# Patient Record
Sex: FEMALE | Race: WHITE | NOT HISPANIC OR LATINO | Employment: UNEMPLOYED | URBAN - METROPOLITAN AREA
[De-identification: names, ages, dates, MRNs, and addresses within clinical notes are randomized per-mention and may not be internally consistent; named-entity substitution may affect disease eponyms.]

---

## 2019-06-12 ENCOUNTER — OFFICE VISIT (OUTPATIENT)
Dept: SPORTS MEDICINE | Facility: CLINIC | Age: 44
End: 2019-06-12

## 2019-06-12 VITALS
HEIGHT: 62 IN | WEIGHT: 112 LBS | SYSTOLIC BLOOD PRESSURE: 114 MMHG | DIASTOLIC BLOOD PRESSURE: 74 MMHG | HEART RATE: 93 BPM | TEMPERATURE: 97.9 F | BODY MASS INDEX: 20.61 KG/M2 | OXYGEN SATURATION: 99 %

## 2019-06-12 DIAGNOSIS — J18.9 WALKING PNEUMONIA: Primary | ICD-10-CM

## 2019-06-12 PROCEDURE — 99214 OFFICE O/P EST MOD 30 MIN: CPT | Performed by: FAMILY MEDICINE

## 2019-06-12 RX ORDER — AZITHROMYCIN 250 MG/1
TABLET, FILM COATED ORAL
Qty: 6 TABLET | Refills: 0 | Status: SHIPPED | OUTPATIENT
Start: 2019-06-12 | End: 2020-01-13

## 2019-06-12 NOTE — PROGRESS NOTES
"Tracey is a 43 y.o. year old female    Chief Complaint   Patient presents with   • Fever     x3 days   • Cough     chest, headache, dizziness, getting worse       History of Present Illness  tmax 101 Thurs thru Sat last wk. Has had cough since, NP. No sinus pressure. Tried Mucinex but didn't help.  dx with [facility] over Memorial Day.    I have reviewed the patient's medical, family, and social history in detail and updated the computerized patient record.    Review of Systems  Constitutional: Per HPI.  HEENT: Per HPI  CV: no palpitations  Resp: Per HPI  Musculoskeletal: Negative for myalgias or arthralgias.  Skin: Negative for rash.   Neurological: Negative for weakness and numbness.   Psychiatric/Behavioral: Negative for sleep disturbance.   All other systems reviewed and are negative.    /74   Pulse 93   Temp 97.9 °F (36.6 °C)   Ht 157.5 cm (62\")   Wt 50.8 kg (112 lb)   SpO2 99%   BMI 20.49 kg/m²      Physical Exam    Vital signs reviewed.   General: No acute distress.  Eyes: conjunctiva clear; pupils equally round and reactive  ENT: external ears and nose atraumatic; oropharynx clear  CV: RRR; no peripheral edema, 2+ distal pulses  Resp: CTA b/l; normal respiratory effort, no use of accessory muscles  Skin: no rashes or wounds; normal turgor  Psych: mood and affect appropriate; recent and remote memory intact  Neuro: sensation to light touch intact    Diagnoses and all orders for this visit:    Walking pneumonia  -     azithromycin (ZITHROMAX) 250 MG tablet; Take 2 tablets the first day, then 1 tablet daily for 4 days.      Cough may persist after treatment.  Afebrile today.  If persist greater than 4 weeks or she has any acute worsening symptoms, I recommend a follow-up.    EMR Dragon/Transcription disclaimer:    Much of this encounter note is an electronic transcription/translation of spoken language to printed text.  The electronic translation of spoken language may permit erroneous, or at " times, nonsensical words or phrases to be inadvertently transcribed.  Although I have reviewed the note for such errors some may still exist.

## 2020-01-13 ENCOUNTER — OFFICE VISIT (OUTPATIENT)
Dept: SPORTS MEDICINE | Facility: CLINIC | Age: 45
End: 2020-01-13

## 2020-01-13 VITALS
DIASTOLIC BLOOD PRESSURE: 80 MMHG | WEIGHT: 120 LBS | HEIGHT: 62 IN | TEMPERATURE: 98.6 F | OXYGEN SATURATION: 100 % | SYSTOLIC BLOOD PRESSURE: 120 MMHG | BODY MASS INDEX: 22.08 KG/M2 | HEART RATE: 85 BPM

## 2020-01-13 DIAGNOSIS — Z23 IMMUNIZATION DUE: ICD-10-CM

## 2020-01-13 DIAGNOSIS — Z00.00 ANNUAL PHYSICAL EXAM: Primary | ICD-10-CM

## 2020-01-13 PROCEDURE — 90471 IMMUNIZATION ADMIN: CPT | Performed by: FAMILY MEDICINE

## 2020-01-13 PROCEDURE — 90715 TDAP VACCINE 7 YRS/> IM: CPT | Performed by: FAMILY MEDICINE

## 2020-01-13 PROCEDURE — 99396 PREV VISIT EST AGE 40-64: CPT | Performed by: FAMILY MEDICINE

## 2020-01-13 NOTE — PROGRESS NOTES
"Tracey Green is here today for an annual physical exam.     Eating a healthy diet. Exercising routinely.     GYN exam 6 wks ago.    I have reviewed the patient's medical, family, and social history in detail and updated the computerized patient record.    Health Maintenance   Topic Date Due   • TDAP/TD VACCINES (1 - Tdap) 11/29/1987   • Annual Gynecologic Pelvic and Breast Exam  01/10/2024 (Originally 1975)   • ANNUAL PHYSICAL  01/14/2021   • PAP SMEAR  11/06/2022   • INFLUENZA VACCINE  Completed       PHQ-2 Depression Screening  Little interest or pleasure in doing things? 0   Feeling down, depressed, or hopeless? 0   PHQ-2 Total Score 0         Review of Systems  Constitutional: Negative for chills, fatigue and fever.   HENT: Negative for postnasal drip and sore throat.    Eyes: Negative for pain.   Respiratory: Negative for cough, chest tightness and wheezing.    Cardiovascular: Negative for chest pain.   Gastrointestinal: Negative.    Musculoskeletal: Negative for myalgias.   Skin: Negative for rash.   Neurological: Negative for numbness and headaches.   Psychiatric/Behavioral: Negative.    All other systems reviewed and are negative.    /80 (BP Location: Right arm, Patient Position: Sitting, Cuff Size: Adult)   Pulse 85   Temp 98.6 °F (37 °C)   Ht 157.5 cm (62.01\")   Wt 54.4 kg (120 lb)   SpO2 100%   BMI 21.94 kg/m²      Physical Exam    Vital signs reviewed.  General appearance: No acute distress  Eyes: conjunctiva clear without erythema; pupils equally round and reactive  ENT: external ears and nose normal; hearing normal, oropharynx clear  Neck: supple; no thyromegaly  CV: normal rate and rhythm; no peripheral edema  Respiratory: normal respiratory effort; lungs clear to auscultation bilaterally  MSK: normal gait and station; no focal joint deformity or swelling  Skin: no rash or wounds; normal turgor  Neuro: cranial nerves 2-12 grossly intact; normal sensation to light touch  Psych: " mood and affect normal; recent and remote memory intact    No visits with results within 2 Week(s) from this visit.   Latest known visit with results is:   Office Visit on 09/27/2016   Component Date Value Ref Range Status   • WBC 09/27/2016 6.04  4.50 - 10.70 10*3/mm3 Final   • RBC 09/27/2016 4.16  3.90 - 5.20 10*6/mm3 Final   • Hemoglobin 09/27/2016 13.1  11.9 - 15.5 g/dL Final   • Hematocrit 09/27/2016 40.2  35.6 - 45.5 % Final   • MCV 09/27/2016 96.6  80.5 - 98.2 fL Final   • MCH 09/27/2016 31.5  26.9 - 32.7 pg Final   • MCHC 09/27/2016 32.6  32.4 - 36.3 g/dL Final   • RDW 09/27/2016 12.8  11.7 - 13.0 % Final   • Platelets 09/27/2016 254  140 - 500 10*3/mm3 Final   • Neutrophil Rel % 09/27/2016 64.1  42.7 - 76.0 % Final   • Lymphocyte Rel % 09/27/2016 26.3  19.6 - 45.3 % Final   • Monocyte Rel % 09/27/2016 7.3  5.0 - 12.0 % Final   • Eosinophil Rel % 09/27/2016 2.0  0.3 - 6.2 % Final   • Basophil Rel % 09/27/2016 0.3  0.0 - 1.5 % Final   • Neutrophils Absolute 09/27/2016 3.87  1.90 - 8.10 10*3/mm3 Final   • Lymphocytes Absolute 09/27/2016 1.59  0.90 - 4.80 10*3/mm3 Final   • Monocytes Absolute 09/27/2016 0.44  0.20 - 1.20 10*3/mm3 Final   • Eosinophils Absolute 09/27/2016 0.12  0.00 - 0.70 10*3/mm3 Final   • Basophils Absolute 09/27/2016 0.02  0.00 - 0.20 10*3/mm3 Final   • Immature Granulocyte Rel % 09/27/2016 0.0  0.0 - 0.5 % Final   • Immature Grans Absolute 09/27/2016 0.00  0.00 - 0.03 10*3/mm3 Final   • Glucose 09/27/2016 86  65 - 99 mg/dL Final   • BUN 09/27/2016 9  6 - 20 mg/dL Final   • Creatinine 09/27/2016 0.89  0.57 - 1.00 mg/dL Final   • eGFR Non  Am 09/27/2016 71  >60 mL/min/1.73 Final   • eGFR African Am 09/27/2016 86  >60 mL/min/1.73 Final   • BUN/Creatinine Ratio 09/27/2016 10.1  7.0 - 25.0 Final   • Sodium 09/27/2016 139  136 - 145 mmol/L Final   • Potassium 09/27/2016 4.5  3.5 - 5.2 mmol/L Final   • Chloride 09/27/2016 99  98 - 107 mmol/L Final   • Total CO2 09/27/2016 26.9  22.0 - 29.0  mmol/L Final   • Calcium 09/27/2016 9.3  8.6 - 10.5 mg/dL Final   • Total Protein 09/27/2016 7.6  6.0 - 8.5 g/dL Final   • Albumin 09/27/2016 4.70  3.50 - 5.20 g/dL Final   • Globulin 09/27/2016 2.9  gm/dL Final   • A/G Ratio 09/27/2016 1.6  g/dL Final   • Total Bilirubin 09/27/2016 0.3  0.1 - 1.2 mg/dL Final   • Alkaline Phosphatase 09/27/2016 59  39 - 117 U/L Final   • AST (SGOT) 09/27/2016 10  1 - 32 U/L Final   • ALT (SGPT) 09/27/2016 7  1 - 33 U/L Final   • Total Cholesterol 09/27/2016 127  0 - 200 mg/dL Final   • Triglycerides 09/27/2016 58  0 - 150 mg/dL Final   • HDL Cholesterol 09/27/2016 88* 40 - 60 mg/dL Final   • VLDL Cholesterol 09/27/2016 11.6  5 - 40 mg/dL Final   • LDL Cholesterol  09/27/2016 27  0 - 100 mg/dL Final        Tracey was seen today for annual exam.    Diagnoses and all orders for this visit:    Annual physical exam  -     CBC & Differential  -     Comprehensive Metabolic Panel  -     Lipid Panel    Immunization due  -     Tdap Vaccine Greater Than or Equal To 8yo IM        Encourage healthy diet and exercise.  Encourage patient to stay up to date on screening examinations as indicated based on age and risk factors.    EMR Dragon/Transcription disclaimer:    Much of this encounter note is an electronic transcription/translation of spoken language to printed text.  The electronic translation of spoken language may permit erroneous, or at times, nonsensical words or phrases to be inadvertently transcribed.  Although I have reviewed the note for such errors some may still exist.

## 2020-01-14 LAB
ALBUMIN SERPL-MCNC: 4.8 G/DL (ref 3.5–5.2)
ALBUMIN/GLOB SERPL: 1.5 G/DL
ALP SERPL-CCNC: 70 U/L (ref 39–117)
ALT SERPL-CCNC: 21 U/L (ref 1–33)
AST SERPL-CCNC: 17 U/L (ref 1–32)
BASOPHILS # BLD AUTO: 0.04 10*3/MM3 (ref 0–0.2)
BASOPHILS NFR BLD AUTO: 0.5 % (ref 0–1.5)
BILIRUB SERPL-MCNC: 0.3 MG/DL (ref 0.2–1.2)
BUN SERPL-MCNC: 11 MG/DL (ref 6–20)
BUN/CREAT SERPL: 13.3 (ref 7–25)
CALCIUM SERPL-MCNC: 9.7 MG/DL (ref 8.6–10.5)
CHLORIDE SERPL-SCNC: 96 MMOL/L (ref 98–107)
CHOLEST SERPL-MCNC: 129 MG/DL (ref 0–200)
CO2 SERPL-SCNC: 27.1 MMOL/L (ref 22–29)
CREAT SERPL-MCNC: 0.83 MG/DL (ref 0.57–1)
EOSINOPHIL # BLD AUTO: 0.06 10*3/MM3 (ref 0–0.4)
EOSINOPHIL NFR BLD AUTO: 0.7 % (ref 0.3–6.2)
ERYTHROCYTE [DISTWIDTH] IN BLOOD BY AUTOMATED COUNT: 12.5 % (ref 12.3–15.4)
GLOBULIN SER CALC-MCNC: 3.3 GM/DL
GLUCOSE SERPL-MCNC: 91 MG/DL (ref 65–99)
HCT VFR BLD AUTO: 39.2 % (ref 34–46.6)
HDLC SERPL-MCNC: 80 MG/DL (ref 40–60)
HGB BLD-MCNC: 13.4 G/DL (ref 12–15.9)
IMM GRANULOCYTES # BLD AUTO: 0.02 10*3/MM3 (ref 0–0.05)
IMM GRANULOCYTES NFR BLD AUTO: 0.2 % (ref 0–0.5)
LDLC SERPL CALC-MCNC: 25 MG/DL (ref 0–100)
LYMPHOCYTES # BLD AUTO: 1.53 10*3/MM3 (ref 0.7–3.1)
LYMPHOCYTES NFR BLD AUTO: 18.8 % (ref 19.6–45.3)
MCH RBC QN AUTO: 31.5 PG (ref 26.6–33)
MCHC RBC AUTO-ENTMCNC: 34.2 G/DL (ref 31.5–35.7)
MCV RBC AUTO: 92 FL (ref 79–97)
MONOCYTES # BLD AUTO: 0.62 10*3/MM3 (ref 0.1–0.9)
MONOCYTES NFR BLD AUTO: 7.6 % (ref 5–12)
NEUTROPHILS # BLD AUTO: 5.85 10*3/MM3 (ref 1.7–7)
NEUTROPHILS NFR BLD AUTO: 72.2 % (ref 42.7–76)
NRBC BLD AUTO-RTO: 0 /100 WBC (ref 0–0.2)
PLATELET # BLD AUTO: 267 10*3/MM3 (ref 140–450)
POTASSIUM SERPL-SCNC: 4 MMOL/L (ref 3.5–5.2)
PROT SERPL-MCNC: 8.1 G/DL (ref 6–8.5)
RBC # BLD AUTO: 4.26 10*6/MM3 (ref 3.77–5.28)
SODIUM SERPL-SCNC: 134 MMOL/L (ref 136–145)
TRIGL SERPL-MCNC: 122 MG/DL (ref 0–150)
VLDLC SERPL CALC-MCNC: 24.4 MG/DL (ref 5–40)
WBC # BLD AUTO: 8.12 10*3/MM3 (ref 3.4–10.8)

## 2020-01-15 ENCOUNTER — TELEPHONE (OUTPATIENT)
Dept: SPORTS MEDICINE | Facility: CLINIC | Age: 45
End: 2020-01-15

## 2020-01-15 NOTE — TELEPHONE ENCOUNTER
----- Message from Camilo Tapia Jr., DO sent at 1/14/2020  8:04 AM EST -----  Labs look good, unchanged from previous check. Continue with healthy lifestyle choices.

## 2021-09-03 ENCOUNTER — OFFICE VISIT (OUTPATIENT)
Dept: SPORTS MEDICINE | Facility: CLINIC | Age: 46
End: 2021-09-03

## 2021-09-03 VITALS
OXYGEN SATURATION: 98 % | HEART RATE: 78 BPM | DIASTOLIC BLOOD PRESSURE: 60 MMHG | HEIGHT: 62 IN | WEIGHT: 120.4 LBS | BODY MASS INDEX: 22.16 KG/M2 | SYSTOLIC BLOOD PRESSURE: 115 MMHG | RESPIRATION RATE: 16 BRPM | TEMPERATURE: 98.4 F

## 2021-09-03 DIAGNOSIS — Z12.11 SCREENING FOR COLON CANCER: ICD-10-CM

## 2021-09-03 DIAGNOSIS — Z00.00 ANNUAL PHYSICAL EXAM: Primary | ICD-10-CM

## 2021-09-03 PROCEDURE — 99396 PREV VISIT EST AGE 40-64: CPT | Performed by: FAMILY MEDICINE

## 2021-09-03 NOTE — PROGRESS NOTES
"Tracey Green is here today for an annual physical exam.     Eating a healthy diet. Exercising routinely.     GYN 11/16/21.  Received covid vax.  Interested in scheduling cscope late Dec after turning 46. No fam hx colon CA.    I have reviewed the patient's medical, family, and social history in detail and updated the computerized patient record.    Health Maintenance   Topic Date Due   • HEPATITIS C SCREENING  Never done   • Annual Gynecologic Pelvic and Breast Exam  01/10/2024 (Originally 1975)   • INFLUENZA VACCINE  10/01/2021   • ANNUAL PHYSICAL  09/04/2022   • PAP SMEAR  11/06/2022   • TDAP/TD VACCINES (2 - Td or Tdap) 01/13/2030   • COVID-19 Vaccine  Completed   • Pneumococcal Vaccine 0-64  Aged Out       PHQ-2 Depression Screening  Little interest or pleasure in doing things? 0   Feeling down, depressed, or hopeless? 0   PHQ-2 Total Score 0     /60 (BP Location: Right arm, Patient Position: Sitting, Cuff Size: Adult)   Pulse 78   Temp 98.4 °F (36.9 °C) (Temporal)   Resp 16   Ht 157.5 cm (62\")   Wt 54.6 kg (120 lb 6.4 oz)   SpO2 98%   BMI 22.02 kg/m²      Physical Exam    Mask worn throughout encounter  Vital signs reviewed.  General appearance: No acute distress  Eyes: conjunctiva clear without erythema; pupils equally round and reactive  ENT: external ears and nose normal; hearing normal   Neck: supple; no thyromegaly  CV: normal rate and rhythm; no peripheral edema  Respiratory: normal respiratory effort; lungs clear to auscultation bilaterally  GI: Bowel sounds x4, soft, nontender  MSK: normal gait and station; no focal joint deformity or swelling  Skin: no rash or wounds; normal turgor  Neuro: cranial nerves 2-12 grossly intact; normal sensation to light touch  Psych: mood and affect normal; recent and remote memory intact    No visits with results within 2 Week(s) from this visit.   Latest known visit with results is:   Office Visit on 01/13/2020   Component Date Value Ref Range " Status   • WBC 01/13/2020 8.12  3.40 - 10.80 10*3/mm3 Final   • RBC 01/13/2020 4.26  3.77 - 5.28 10*6/mm3 Final   • Hemoglobin 01/13/2020 13.4  12.0 - 15.9 g/dL Final   • Hematocrit 01/13/2020 39.2  34.0 - 46.6 % Final   • MCV 01/13/2020 92.0  79.0 - 97.0 fL Final   • MCH 01/13/2020 31.5  26.6 - 33.0 pg Final   • MCHC 01/13/2020 34.2  31.5 - 35.7 g/dL Final   • RDW 01/13/2020 12.5  12.3 - 15.4 % Final   • Platelets 01/13/2020 267  140 - 450 10*3/mm3 Final   • Neutrophil Rel % 01/13/2020 72.2  42.7 - 76.0 % Final   • Lymphocyte Rel % 01/13/2020 18.8* 19.6 - 45.3 % Final   • Monocyte Rel % 01/13/2020 7.6  5.0 - 12.0 % Final   • Eosinophil Rel % 01/13/2020 0.7  0.3 - 6.2 % Final   • Basophil Rel % 01/13/2020 0.5  0.0 - 1.5 % Final   • Neutrophils Absolute 01/13/2020 5.85  1.70 - 7.00 10*3/mm3 Final   • Lymphocytes Absolute 01/13/2020 1.53  0.70 - 3.10 10*3/mm3 Final   • Monocytes Absolute 01/13/2020 0.62  0.10 - 0.90 10*3/mm3 Final   • Eosinophils Absolute 01/13/2020 0.06  0.00 - 0.40 10*3/mm3 Final   • Basophils Absolute 01/13/2020 0.04  0.00 - 0.20 10*3/mm3 Final   • Immature Granulocyte Rel % 01/13/2020 0.2  0.0 - 0.5 % Final   • Immature Grans Absolute 01/13/2020 0.02  0.00 - 0.05 10*3/mm3 Final   • nRBC 01/13/2020 0.0  0.0 - 0.2 /100 WBC Final   • Glucose 01/13/2020 91  65 - 99 mg/dL Final   • BUN 01/13/2020 11  6 - 20 mg/dL Final   • Creatinine 01/13/2020 0.83  0.57 - 1.00 mg/dL Final   • eGFR Non African Am 01/13/2020 75  >60 mL/min/1.73 Final   • eGFR African Am 01/13/2020 91  >60 mL/min/1.73 Final   • BUN/Creatinine Ratio 01/13/2020 13.3  7.0 - 25.0 Final   • Sodium 01/13/2020 134* 136 - 145 mmol/L Final   • Potassium 01/13/2020 4.0  3.5 - 5.2 mmol/L Final   • Chloride 01/13/2020 96* 98 - 107 mmol/L Final   • Total CO2 01/13/2020 27.1  22.0 - 29.0 mmol/L Final   • Calcium 01/13/2020 9.7  8.6 - 10.5 mg/dL Final   • Total Protein 01/13/2020 8.1  6.0 - 8.5 g/dL Final   • Albumin 01/13/2020 4.80  3.50 - 5.20 g/dL  Final   • Globulin 01/13/2020 3.3  gm/dL Final   • A/G Ratio 01/13/2020 1.5  g/dL Final   • Total Bilirubin 01/13/2020 0.3  0.2 - 1.2 mg/dL Final   • Alkaline Phosphatase 01/13/2020 70  39 - 117 U/L Final   • AST (SGOT) 01/13/2020 17  1 - 32 U/L Final   • ALT (SGPT) 01/13/2020 21  1 - 33 U/L Final   • Total Cholesterol 01/13/2020 129  0 - 200 mg/dL Final   • Triglycerides 01/13/2020 122  0 - 150 mg/dL Final   • HDL Cholesterol 01/13/2020 80* 40 - 60 mg/dL Final   • VLDL Cholesterol 01/13/2020 24.4  5 - 40 mg/dL Final   • LDL Cholesterol  01/13/2020 25  0 - 100 mg/dL Final        Diagnoses and all orders for this visit:    1. Annual physical exam (Primary)  -     CBC & Differential  -     Comprehensive Metabolic Panel  -     Hepatitis C Antibody  -     Lipid Panel    2. Screening for colon cancer        Encourage healthy diet and exercise.  Encourage patient to stay up to date on screening examinations as indicated based on age and risk factors.    EMR Dragon/Transcription disclaimer:    Much of this encounter note is an electronic transcription/translation of spoken language to printed text.  The electronic translation of spoken language may permit erroneous, or at times, nonsensical words or phrases to be inadvertently transcribed.  Although I have reviewed the note for such errors some may still exist.

## 2021-09-04 LAB
ALBUMIN SERPL-MCNC: 4.7 G/DL (ref 3.5–5.2)
ALBUMIN/GLOB SERPL: 1.7 G/DL
ALP SERPL-CCNC: 73 U/L (ref 39–117)
ALT SERPL-CCNC: 16 U/L (ref 1–33)
AST SERPL-CCNC: 18 U/L (ref 1–32)
BASOPHILS # BLD AUTO: 0.04 10*3/MM3 (ref 0–0.2)
BASOPHILS NFR BLD AUTO: 0.7 % (ref 0–1.5)
BILIRUB SERPL-MCNC: 0.4 MG/DL (ref 0–1.2)
BUN SERPL-MCNC: 11 MG/DL (ref 6–20)
BUN/CREAT SERPL: 12.2 (ref 7–25)
CALCIUM SERPL-MCNC: 9.4 MG/DL (ref 8.6–10.5)
CHLORIDE SERPL-SCNC: 103 MMOL/L (ref 98–107)
CHOLEST SERPL-MCNC: 133 MG/DL (ref 0–200)
CO2 SERPL-SCNC: 27.9 MMOL/L (ref 22–29)
CREAT SERPL-MCNC: 0.9 MG/DL (ref 0.57–1)
EOSINOPHIL # BLD AUTO: 0.07 10*3/MM3 (ref 0–0.4)
EOSINOPHIL NFR BLD AUTO: 1.2 % (ref 0.3–6.2)
ERYTHROCYTE [DISTWIDTH] IN BLOOD BY AUTOMATED COUNT: 12.6 % (ref 12.3–15.4)
GLOBULIN SER CALC-MCNC: 2.8 GM/DL
GLUCOSE SERPL-MCNC: 91 MG/DL (ref 65–99)
HCT VFR BLD AUTO: 39.3 % (ref 34–46.6)
HCV AB S/CO SERPL IA: 0.1 S/CO RATIO (ref 0–0.9)
HDLC SERPL-MCNC: 88 MG/DL (ref 40–60)
HGB BLD-MCNC: 13.3 G/DL (ref 12–15.9)
IMM GRANULOCYTES # BLD AUTO: 0.01 10*3/MM3 (ref 0–0.05)
IMM GRANULOCYTES NFR BLD AUTO: 0.2 % (ref 0–0.5)
LDLC SERPL CALC-MCNC: 32 MG/DL (ref 0–100)
LYMPHOCYTES # BLD AUTO: 1.34 10*3/MM3 (ref 0.7–3.1)
LYMPHOCYTES NFR BLD AUTO: 22.8 % (ref 19.6–45.3)
MCH RBC QN AUTO: 32.4 PG (ref 26.6–33)
MCHC RBC AUTO-ENTMCNC: 33.8 G/DL (ref 31.5–35.7)
MCV RBC AUTO: 95.9 FL (ref 79–97)
MONOCYTES # BLD AUTO: 0.43 10*3/MM3 (ref 0.1–0.9)
MONOCYTES NFR BLD AUTO: 7.3 % (ref 5–12)
NEUTROPHILS # BLD AUTO: 3.99 10*3/MM3 (ref 1.7–7)
NEUTROPHILS NFR BLD AUTO: 67.8 % (ref 42.7–76)
NRBC BLD AUTO-RTO: 0 /100 WBC (ref 0–0.2)
PLATELET # BLD AUTO: 235 10*3/MM3 (ref 140–450)
POTASSIUM SERPL-SCNC: 4.1 MMOL/L (ref 3.5–5.2)
PROT SERPL-MCNC: 7.5 G/DL (ref 6–8.5)
RBC # BLD AUTO: 4.1 10*6/MM3 (ref 3.77–5.28)
SODIUM SERPL-SCNC: 139 MMOL/L (ref 136–145)
TRIGL SERPL-MCNC: 60 MG/DL (ref 0–150)
VLDLC SERPL CALC-MCNC: 13 MG/DL (ref 5–40)
WBC # BLD AUTO: 5.88 10*3/MM3 (ref 3.4–10.8)

## 2021-09-14 NOTE — PROGRESS NOTES
Called patient in regards to labs, verbalized understanding. Pt stated she would like to see Dr Kalyani Masterson for her colonoscopy when she turns 46

## 2021-12-06 ENCOUNTER — TELEPHONE (OUTPATIENT)
Dept: SPORTS MEDICINE | Facility: CLINIC | Age: 46
End: 2021-12-06

## 2021-12-06 DIAGNOSIS — Z12.11 SCREENING FOR COLON CANCER: Primary | ICD-10-CM

## 2021-12-06 NOTE — TELEPHONE ENCOUNTER
Patient called back and wanted to know if we had sent in the order for her to get colonoscopy done. Pt stated she would like to see Dr Kalyani Masterson. Please advise, thank you!

## 2021-12-17 ENCOUNTER — PREP FOR SURGERY (OUTPATIENT)
Dept: OTHER | Facility: HOSPITAL | Age: 46
End: 2021-12-17

## 2021-12-17 DIAGNOSIS — Z87.738 HISTORY OF HIRSCHSPRUNG'S DISEASE: ICD-10-CM

## 2021-12-17 DIAGNOSIS — Z12.11 SCREENING FOR COLON CANCER: Primary | ICD-10-CM

## 2021-12-28 PROBLEM — Z87.738 HISTORY OF HIRSCHSPRUNG'S DISEASE: Status: ACTIVE | Noted: 2021-12-28

## 2021-12-28 PROBLEM — Z12.11 SCREENING FOR COLON CANCER: Status: ACTIVE | Noted: 2021-12-28

## 2022-02-02 ENCOUNTER — ANESTHESIA EVENT (OUTPATIENT)
Dept: GASTROENTEROLOGY | Facility: HOSPITAL | Age: 47
End: 2022-02-02

## 2022-02-03 ENCOUNTER — ANESTHESIA (OUTPATIENT)
Dept: GASTROENTEROLOGY | Facility: HOSPITAL | Age: 47
End: 2022-02-03

## 2022-02-03 ENCOUNTER — HOSPITAL ENCOUNTER (OUTPATIENT)
Facility: HOSPITAL | Age: 47
Setting detail: HOSPITAL OUTPATIENT SURGERY
Discharge: HOME | End: 2022-02-03
Attending: SURGERY | Admitting: SURGERY

## 2022-02-03 VITALS
HEART RATE: 76 BPM | BODY MASS INDEX: 20.98 KG/M2 | TEMPERATURE: 98.3 F | DIASTOLIC BLOOD PRESSURE: 88 MMHG | WEIGHT: 114 LBS | OXYGEN SATURATION: 98 % | RESPIRATION RATE: 17 BRPM | SYSTOLIC BLOOD PRESSURE: 111 MMHG | HEIGHT: 62 IN

## 2022-02-03 PROCEDURE — S0260 H&P FOR SURGERY: HCPCS | Performed by: SURGERY

## 2022-02-03 PROCEDURE — 25010000002 PROPOFOL 10 MG/ML EMULSION: Performed by: ANESTHESIOLOGY

## 2022-02-03 PROCEDURE — 45378 DIAGNOSTIC COLONOSCOPY: CPT | Performed by: SURGERY

## 2022-02-03 RX ORDER — SODIUM CHLORIDE, SODIUM LACTATE, POTASSIUM CHLORIDE, CALCIUM CHLORIDE 600; 310; 30; 20 MG/100ML; MG/100ML; MG/100ML; MG/100ML
30 INJECTION, SOLUTION INTRAVENOUS CONTINUOUS PRN
Status: DISCONTINUED | OUTPATIENT
Start: 2022-02-03 | End: 2022-02-03 | Stop reason: HOSPADM

## 2022-02-03 RX ORDER — PROPOFOL 10 MG/ML
VIAL (ML) INTRAVENOUS CONTINUOUS PRN
Status: DISCONTINUED | OUTPATIENT
Start: 2022-02-03 | End: 2022-02-03 | Stop reason: SURG

## 2022-02-03 RX ORDER — PROPOFOL 10 MG/ML
VIAL (ML) INTRAVENOUS AS NEEDED
Status: DISCONTINUED | OUTPATIENT
Start: 2022-02-03 | End: 2022-02-03 | Stop reason: SURG

## 2022-02-03 RX ORDER — LIDOCAINE HYDROCHLORIDE 20 MG/ML
INJECTION, SOLUTION INFILTRATION; PERINEURAL AS NEEDED
Status: DISCONTINUED | OUTPATIENT
Start: 2022-02-03 | End: 2022-02-03 | Stop reason: SURG

## 2022-02-03 RX ADMIN — PROPOFOL 120 MG: 10 INJECTION, EMULSION INTRAVENOUS at 08:09

## 2022-02-03 RX ADMIN — LIDOCAINE HYDROCHLORIDE 60 MG: 20 INJECTION, SOLUTION INFILTRATION; PERINEURAL at 08:09

## 2022-02-03 RX ADMIN — SODIUM CHLORIDE, POTASSIUM CHLORIDE, SODIUM LACTATE AND CALCIUM CHLORIDE 30 ML/HR: 600; 310; 30; 20 INJECTION, SOLUTION INTRAVENOUS at 07:39

## 2022-02-03 RX ADMIN — Medication 200 MCG/KG/MIN: at 08:09

## 2022-02-03 NOTE — ANESTHESIA POSTPROCEDURE EVALUATION
Patient: Tracey Green    Procedure Summary     Date: 02/03/22 Room / Location:  [facility]    Anesthesia Start: 0803 Anesthesia Stop: 0825    Procedure: COLONOSCOPY into cecum (N/A ) Diagnosis:       Screening for colon cancer      History of Hirschsprung's disease      (Screening for colon cancer [Z12.11])      (History of Hirschsprung's disease [Z87.738])    Surgeons: Kalyani Masterson MD Provider: Ignacio Rouse MD    Anesthesia Type: MAC ASA Status: 1          Anesthesia Type: MAC    Vitals  Vitals Value Taken Time   /73 02/03/22 0825   Temp     Pulse 83 02/03/22 0825   Resp 13 02/03/22 0825   SpO2 97 % 02/03/22 0825           Post Anesthesia Care and Evaluation    Patient location during evaluation: PHASE II  Patient participation: complete - patient participated  Level of consciousness: awake and alert  Pain management: adequate  Airway patency: patent  Anesthetic complications: No anesthetic complications  PONV Status: none  Cardiovascular status: acceptable and hemodynamically stable  Respiratory status: acceptable, nonlabored ventilation and spontaneous ventilation  Hydration status: acceptable

## 2022-02-03 NOTE — OP NOTE
Operative Note :  Kalyani Masterson MD      Tracey Green  1975    Procedure Date: 02/03/22    Pre-op Diagnosis:  Screening for colon cancer [Z12.11]  History of Hirschsprung's disease [Z87.738]    Post-Operative Diagnosis:  History of Hirschsprung's disease with partial colectomy    Procedure:   Flexible colonoscopy to the cecum    Surgeon: Kalyani Masterson MD    Assistant: None    Anesthesia:  MAC (monitored anesthetic care)    Estimated Blood Loss: Minimal    Specimens: None    Complications: None    Indications:  Mrs. Green is a 46-year-old lady with a history of Hirschsprung's disease status post anal pull-through who presents today for a routine screening colonoscopy.  She has been counseled on the risks of the procedure to include bleeding, possible colon perforation, and possible missed pathology.  Despite these risks, she has consented to proceed.    Findings: Normal anatomy following partial colectomy for Hirschsprung's disease with anal pull-through, no colon polyps    Description of procedure:  The patient was brought to the endoscopy suite and john in the left lateral decubitus position.  Continuous propofol anesthesia was administered.  A surgical timeout was completed.  A digital rectal exam was performed, revealing no abnormalities.  An adult colonoscope was then inserted through the anus and passed under direct visualization to the level of the cecum.  She had previously had a partial colectomy with what appeared to be the sigmoid and descending colon removed and a widely patent colorectal anastomosis just above the anus.  The cecum was identified via the ileocecal valve as well as the appendiceal orifice.  The scope was then slowly withdrawn, examining all circumferential walls of the ascending and transverse colon.  There were no colon polyps and no signs of diverticulosis.  Within the rectum the scope was retroflexed, but her rectum consisted of essentially a 2 to 3 cm segment of  rectum connected to her colorectal anastomosis and there was no evidence for anastomotic stricture or internal hemorrhoids.  The scope was then withdrawn and the colon desufflated.  The patient had a very good bowel prep and was transferred to the recovery area in stable condition.     Recommendations:  Repeat colonoscopy in 10 years for ongoing screening.    Kalyani Masterson MD  General, Robotic, and Endoscopic Surgery  [facility]    [address], [address]  [place] [address]  P: [phone]  F: [phone]

## 2022-02-03 NOTE — H&P
General Surgery  History and Physical    CC: Screening for colonoscopy, history of Hirschsprung's disease    HPI: The patient is a pleasant 46 y.o. year-old lady who presents today for repeat screening colonoscopy.  She had a colonoscopy done in  while living in [place], [place] and reports no colon polyps were found.  She has a history of Hirschsprung's disease and had an anal pull-through at age 2.  She denies any melena or hematochezia and has no family history of colon cancer.    Past Medical History:   Hirschsprung's disease    Past Surgical History:   Anal pull-through, partial colectomy, and appendectomy   x2  Colonoscopy ( in [facility])    Medications: None    Allergies: No drug allergies    Family History: Grandparents with history of lung cancer, no family history of gastrointestinal malignancy    Social History: , non-smoker, social alcohol use    ROS: A comprehensive review of systems was conducted and negative for melena or hematochezia  All other systems reviewed and negative    Physical Exam:  Vitals:    22 0732   BP: 109/74   Pulse: 101   Resp: 13   Temp: 98.3 °F (36.8 °C)   SpO2: 97%     Height: 157 cm  Weight: 51 kg  BMI: 20  General: No acute distress, well-nourished & well-developed  HEAD: normocephalic, atraumatic  EYES: normal conjunctiva, sclera anicteric  EARS: grossly normal hearing  NECK: supple, no thyromegaly  CARDIOVASCULAR: regular rate and rhythm  RESPIRATORY: clear to auscultation bilaterally  GASTROINTESTINAL: soft, nontender, non-distended  PSYCHIATRIC: oriented x3, normal mood and affect    ASSESSMENT & PLAN  Mrs. Green is a 46-year-old lady with a history of Hirschsprung's disease status post anal pull-through who presents today for a routine screening colonoscopy.  She has been counseled on the risks of the procedure to include bleeding, possible colon perforation, and possible missed pathology.  Despite these risks, she has consented to  proceed.    Kalyani Masterson MD  General, Robotic, and Endoscopic Surgery  [facility]    [address], [address]  [place] [address]  P: [phone]  F: [phone]

## 2022-02-03 NOTE — DISCHARGE INSTRUCTIONS
For the next 24 hours patient needs to be with a responsible adult.    For 24 hours DO NOT drive, operate machinery, appliances, drink alcohol, make important decisions or sign legal documents.    Start with a light or bland diet if you are feeling sick to your stomach otherwise advance to regular diet as tolerated.    Follow recommendations on procedure report if provided by your doctor.    Call Dr Masterson for problems [phone]    Problems may include but not limited to: large amounts of bleeding, trouble breathing, repeated vomiting, severe unrelieved pain, fever or chills.

## 2022-09-07 ENCOUNTER — LAB (OUTPATIENT)
Dept: LAB | Facility: HOSPITAL | Age: 47
End: 2022-09-07

## 2022-09-07 ENCOUNTER — OFFICE VISIT (OUTPATIENT)
Dept: SPORTS MEDICINE | Facility: CLINIC | Age: 47
End: 2022-09-07

## 2022-09-07 VITALS
HEART RATE: 82 BPM | WEIGHT: 120 LBS | TEMPERATURE: 97.6 F | RESPIRATION RATE: 16 BRPM | BODY MASS INDEX: 21.26 KG/M2 | OXYGEN SATURATION: 95 % | DIASTOLIC BLOOD PRESSURE: 70 MMHG | SYSTOLIC BLOOD PRESSURE: 104 MMHG | HEIGHT: 63 IN

## 2022-09-07 DIAGNOSIS — Z00.00 ANNUAL PHYSICAL EXAM: Primary | ICD-10-CM

## 2022-09-07 PROBLEM — Z12.11 SCREENING FOR COLON CANCER: Status: RESOLVED | Noted: 2021-12-28 | Resolved: 2022-09-07

## 2022-09-07 LAB
ALBUMIN SERPL-MCNC: 4.5 G/DL (ref 3.5–5.2)
ALBUMIN/GLOB SERPL: 1.4 G/DL
ALP SERPL-CCNC: 76 U/L (ref 39–117)
ALT SERPL W P-5'-P-CCNC: 8 U/L (ref 1–33)
ANION GAP SERPL CALCULATED.3IONS-SCNC: 9.3 MMOL/L (ref 5–15)
AST SERPL-CCNC: 12 U/L (ref 1–32)
BASOPHILS # BLD AUTO: 0.03 10*3/MM3 (ref 0–0.2)
BASOPHILS NFR BLD AUTO: 0.4 % (ref 0–1.5)
BILIRUB SERPL-MCNC: 0.3 MG/DL (ref 0–1.2)
BUN SERPL-MCNC: 10 MG/DL (ref 6–20)
BUN/CREAT SERPL: 10.8 (ref 7–25)
CALCIUM SPEC-SCNC: 9.5 MG/DL (ref 8.6–10.5)
CHLORIDE SERPL-SCNC: 103 MMOL/L (ref 98–107)
CHOLEST SERPL-MCNC: 138 MG/DL (ref 0–200)
CO2 SERPL-SCNC: 27.7 MMOL/L (ref 22–29)
CREAT SERPL-MCNC: 0.93 MG/DL (ref 0.57–1)
DEPRECATED RDW RBC AUTO: 43.4 FL (ref 37–54)
EGFRCR SERPLBLD CKD-EPI 2021: 77.4 ML/MIN/1.73
EOSINOPHIL # BLD AUTO: 0.12 10*3/MM3 (ref 0–0.4)
EOSINOPHIL NFR BLD AUTO: 1.8 % (ref 0.3–6.2)
ERYTHROCYTE [DISTWIDTH] IN BLOOD BY AUTOMATED COUNT: 12.4 % (ref 12.3–15.4)
GLOBULIN UR ELPH-MCNC: 3.3 GM/DL
GLUCOSE SERPL-MCNC: 95 MG/DL (ref 65–99)
HCT VFR BLD AUTO: 40 % (ref 34–46.6)
HDLC SERPL-MCNC: 82 MG/DL (ref 40–60)
HGB BLD-MCNC: 13.5 G/DL (ref 12–15.9)
IMM GRANULOCYTES # BLD AUTO: 0.01 10*3/MM3 (ref 0–0.05)
IMM GRANULOCYTES NFR BLD AUTO: 0.1 % (ref 0–0.5)
LDLC SERPL CALC-MCNC: 35 MG/DL (ref 0–100)
LDLC/HDLC SERPL: 0.39 {RATIO}
LYMPHOCYTES # BLD AUTO: 1.64 10*3/MM3 (ref 0.7–3.1)
LYMPHOCYTES NFR BLD AUTO: 24.6 % (ref 19.6–45.3)
MCH RBC QN AUTO: 32.5 PG (ref 26.6–33)
MCHC RBC AUTO-ENTMCNC: 33.8 G/DL (ref 31.5–35.7)
MCV RBC AUTO: 96.2 FL (ref 79–97)
MONOCYTES # BLD AUTO: 0.51 10*3/MM3 (ref 0.1–0.9)
MONOCYTES NFR BLD AUTO: 7.6 % (ref 5–12)
NEUTROPHILS NFR BLD AUTO: 4.36 10*3/MM3 (ref 1.7–7)
NEUTROPHILS NFR BLD AUTO: 65.5 % (ref 42.7–76)
NRBC BLD AUTO-RTO: 0 /100 WBC (ref 0–0.2)
PLATELET # BLD AUTO: 276 10*3/MM3 (ref 140–450)
PMV BLD AUTO: 9.2 FL (ref 6–12)
POTASSIUM SERPL-SCNC: 4.2 MMOL/L (ref 3.5–5.2)
PROT SERPL-MCNC: 7.8 G/DL (ref 6–8.5)
RBC # BLD AUTO: 4.16 10*6/MM3 (ref 3.77–5.28)
SODIUM SERPL-SCNC: 140 MMOL/L (ref 136–145)
TRIGL SERPL-MCNC: 122 MG/DL (ref 0–150)
VLDLC SERPL-MCNC: 21 MG/DL (ref 5–40)
WBC NRBC COR # BLD: 6.67 10*3/MM3 (ref 3.4–10.8)

## 2022-09-07 PROCEDURE — 36415 COLL VENOUS BLD VENIPUNCTURE: CPT | Performed by: FAMILY MEDICINE

## 2022-09-07 PROCEDURE — 80061 LIPID PANEL: CPT | Performed by: FAMILY MEDICINE

## 2022-09-07 PROCEDURE — 80053 COMPREHEN METABOLIC PANEL: CPT | Performed by: FAMILY MEDICINE

## 2022-09-07 PROCEDURE — 99396 PREV VISIT EST AGE 40-64: CPT | Performed by: FAMILY MEDICINE

## 2022-09-07 PROCEDURE — 85025 COMPLETE CBC W/AUTO DIFF WBC: CPT | Performed by: FAMILY MEDICINE

## 2022-09-07 NOTE — PROGRESS NOTES
"Tracey Green is here today for an annual physical exam.     Eating a healthy diet. Exercising routinely.     Bought readers over the past few mo.  cscope last yr - 10 yr recall.  UTD w/GYN.  Sees derm yrly.    I have reviewed the patient's medical, family, and social history in detail and updated the computerized patient record.    Health Maintenance   Topic Date Due   • ANNUAL PHYSICAL  09/04/2022   • Annual Gynecologic Pelvic and Breast Exam  01/10/2024 (Originally 1975)   • INFLUENZA VACCINE  10/01/2022   • PAP SMEAR  11/06/2022   • TDAP/TD VACCINES (2 - Td or Tdap) 01/13/2030   • COLORECTAL CANCER SCREENING  02/03/2032   • HEPATITIS C SCREENING  Completed   • COVID-19 Vaccine  Completed   • Pneumococcal Vaccine 0-64  Aged Out         /70 (BP Location: Left arm, Patient Position: Sitting, Cuff Size: Adult)   Pulse 82   Temp 97.6 °F (36.4 °C) (Temporal)   Resp 16   Ht 160 cm (63\")   Wt 54.4 kg (120 lb)   SpO2 95%   BMI 21.26 kg/m²      Physical Exam    Mask worn throughout encounter  Vital signs reviewed.  General appearance: No acute distress  Eyes: conjunctiva clear without erythema; pupils equally round and reactive  ENT: external ears and nose normal; hearing normal   Neck: supple; no thyromegaly  CV: normal rate and rhythm; no peripheral edema  Respiratory: normal respiratory effort; lungs clear to auscultation bilaterally  GI: Bowel sounds x4, soft, nontender  MSK: normal gait and station; no focal joint deformity or swelling  Skin: no rash or wounds; normal turgor  Neuro: cranial nerves 2-12 grossly intact; normal sensation to light touch  Psych: mood and affect normal; recent and remote memory intact    No visits with results within 2 Week(s) from this visit.   Latest known visit with results is:   Office Visit on 09/03/2021   Component Date Value Ref Range Status   • WBC 09/03/2021 5.88  3.40 - 10.80 10*3/mm3 Final   • RBC 09/03/2021 4.10  3.77 - 5.28 10*6/mm3 Final   • Hemoglobin " 09/03/2021 13.3  12.0 - 15.9 g/dL Final   • Hematocrit 09/03/2021 39.3  34.0 - 46.6 % Final   • MCV 09/03/2021 95.9  79.0 - 97.0 fL Final   • MCH 09/03/2021 32.4  26.6 - 33.0 pg Final   • MCHC 09/03/2021 33.8  31.5 - 35.7 g/dL Final   • RDW 09/03/2021 12.6  12.3 - 15.4 % Final   • Platelets 09/03/2021 235  140 - 450 10*3/mm3 Final   • Neutrophil Rel % 09/03/2021 67.8  42.7 - 76.0 % Final   • Lymphocyte Rel % 09/03/2021 22.8  19.6 - 45.3 % Final   • Monocyte Rel % 09/03/2021 7.3  5.0 - 12.0 % Final   • Eosinophil Rel % 09/03/2021 1.2  0.3 - 6.2 % Final   • Basophil Rel % 09/03/2021 0.7  0.0 - 1.5 % Final   • Neutrophils Absolute 09/03/2021 3.99  1.70 - 7.00 10*3/mm3 Final   • Lymphocytes Absolute 09/03/2021 1.34  0.70 - 3.10 10*3/mm3 Final   • Monocytes Absolute 09/03/2021 0.43  0.10 - 0.90 10*3/mm3 Final   • Eosinophils Absolute 09/03/2021 0.07  0.00 - 0.40 10*3/mm3 Final   • Basophils Absolute 09/03/2021 0.04  0.00 - 0.20 10*3/mm3 Final   • Immature Granulocyte Rel % 09/03/2021 0.2  0.0 - 0.5 % Final   • Immature Grans Absolute 09/03/2021 0.01  0.00 - 0.05 10*3/mm3 Final   • nRBC 09/03/2021 0.0  0.0 - 0.2 /100 WBC Final   • Glucose 09/03/2021 91  65 - 99 mg/dL Final   • BUN 09/03/2021 11  6 - 20 mg/dL Final   • Creatinine 09/03/2021 0.90  0.57 - 1.00 mg/dL Final   • eGFR Non  Am 09/03/2021 68  >60 mL/min/1.73 Final    Comment: GFR Normal >60  Chronic Kidney Disease <60  Kidney Failure <15     • eGFR  Am 09/03/2021 82  >60 mL/min/1.73 Final   • BUN/Creatinine Ratio 09/03/2021 12.2  7.0 - 25.0 Final   • Sodium 09/03/2021 139  136 - 145 mmol/L Final   • Potassium 09/03/2021 4.1  3.5 - 5.2 mmol/L Final   • Chloride 09/03/2021 103  98 - 107 mmol/L Final   • Total CO2 09/03/2021 27.9  22.0 - 29.0 mmol/L Final   • Calcium 09/03/2021 9.4  8.6 - 10.5 mg/dL Final   • Total Protein 09/03/2021 7.5  6.0 - 8.5 g/dL Final   • Albumin 09/03/2021 4.70  3.50 - 5.20 g/dL Final   • Globulin 09/03/2021 2.8  gm/dL Final    • A/G Ratio 09/03/2021 1.7  g/dL Final   • Total Bilirubin 09/03/2021 0.4  0.0 - 1.2 mg/dL Final   • Alkaline Phosphatase 09/03/2021 73  39 - 117 U/L Final   • AST (SGOT) 09/03/2021 18  1 - 32 U/L Final   • ALT (SGPT) 09/03/2021 16  1 - 33 U/L Final   • Hep C Virus Ab 09/03/2021 0.1  0.0 - 0.9 s/co ratio Final    Comment:                                   Negative:     < 0.8                               Indeterminate: 0.8 - 0.9                                    Positive:     > 0.9   The CDC recommends that a positive HCV antibody result   be followed up with a HCV Nucleic Acid Amplification   test ([id number]).     • Total Cholesterol 09/03/2021 133  0 - 200 mg/dL Final    Comment: Cholesterol Reference Ranges  (U.S. Department of Health and Human Services ATP III  Classifications)  Desirable          <200 mg/dL  Borderline High    200-239 mg/dL  High Risk          >240 mg/dL  Triglyceride Reference Ranges  (U.S. Department of Health and Human Services ATP III  Classifications)  Normal           <150 mg/dL  Borderline High  150-199 mg/dL  High             200-499 mg/dL  Very High        >500 mg/dL  HDL Reference Ranges  (U.S. Department of Health and Human Services ATP III  Classifcations)  Low     <40 mg/dl (major risk factor for CHD)  High    >60 mg/dl ('negative' risk factor for CHD)  LDL Reference Ranges  (U.S. Department of Health and Human Services ATP III  Classifcations)  Optimal          <100 mg/dL  Near Optimal     100-129 mg/dL  Borderline High  130-159 mg/dL  High             160-189 mg/dL  Very High        >189 mg/dL     • Triglycerides 09/03/2021 60  0 - 150 mg/dL Final   • HDL Cholesterol 09/03/2021 88 (A) 40 - 60 mg/dL Final   • VLDL Cholesterol Jorge 09/03/2021 13  5 - 40 mg/dL Final   • LDL Chol Calc (NIH) 09/03/2021 32  0 - 100 mg/dL Final        Diagnoses and all orders for this visit:    1. Annual physical exam (Primary)  -     CBC & Differential  -     Comprehensive Metabolic Panel  -     Lipid  Panel        Encourage healthy diet and exercise.  Encourage patient to stay up to date on screening examinations as indicated based on age and risk factors.    EMR Dragon/Transcription disclaimer:    Much of this encounter note is an electronic transcription/translation of spoken language to printed text.  The electronic translation of spoken language may permit erroneous, or at times, nonsensical words or phrases to be inadvertently transcribed.  Although I have reviewed the note for such errors some may still exist.

## 2023-01-17 NOTE — ANESTHESIA PREPROCEDURE EVALUATION
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        CODE STATUS:        none

## 2024-08-27 ENCOUNTER — OFFICE VISIT (OUTPATIENT)
Dept: SPORTS MEDICINE | Facility: CLINIC | Age: 49
End: 2024-08-27
Payer: COMMERCIAL

## 2024-08-27 ENCOUNTER — LAB (OUTPATIENT)
Dept: LAB | Facility: HOSPITAL | Age: 49
End: 2024-08-27
Payer: COMMERCIAL

## 2024-08-27 VITALS
SYSTOLIC BLOOD PRESSURE: 100 MMHG | BODY MASS INDEX: 21.26 KG/M2 | HEIGHT: 63 IN | DIASTOLIC BLOOD PRESSURE: 60 MMHG | OXYGEN SATURATION: 99 % | RESPIRATION RATE: 16 BRPM | HEART RATE: 67 BPM | WEIGHT: 120 LBS

## 2024-08-27 DIAGNOSIS — Z00.00 ENCOUNTER FOR ANNUAL PHYSICAL EXAM: Primary | ICD-10-CM

## 2024-08-27 LAB
ALBUMIN SERPL-MCNC: 4.2 G/DL (ref 3.5–5.2)
ALBUMIN/GLOB SERPL: 1.2 G/DL
ALP SERPL-CCNC: 72 U/L (ref 39–117)
ALT SERPL W P-5'-P-CCNC: 11 U/L (ref 1–33)
ANION GAP SERPL CALCULATED.3IONS-SCNC: 8 MMOL/L (ref 5–15)
AST SERPL-CCNC: 14 U/L (ref 1–32)
BASOPHILS # BLD AUTO: 0.05 10*3/MM3 (ref 0–0.2)
BASOPHILS NFR BLD AUTO: 0.8 % (ref 0–1.5)
BILIRUB SERPL-MCNC: 0.4 MG/DL (ref 0–1.2)
BUN SERPL-MCNC: 8 MG/DL (ref 6–20)
BUN/CREAT SERPL: 8.9 (ref 7–25)
CALCIUM SPEC-SCNC: 9.4 MG/DL (ref 8.6–10.5)
CHLORIDE SERPL-SCNC: 103 MMOL/L (ref 98–107)
CHOLEST SERPL-MCNC: 130 MG/DL (ref 0–200)
CO2 SERPL-SCNC: 26 MMOL/L (ref 22–29)
CREAT SERPL-MCNC: 0.9 MG/DL (ref 0.57–1)
DEPRECATED RDW RBC AUTO: 44.7 FL (ref 37–54)
EGFRCR SERPLBLD CKD-EPI 2021: 79.5 ML/MIN/1.73
EOSINOPHIL # BLD AUTO: 0.09 10*3/MM3 (ref 0–0.4)
EOSINOPHIL NFR BLD AUTO: 1.4 % (ref 0.3–6.2)
ERYTHROCYTE [DISTWIDTH] IN BLOOD BY AUTOMATED COUNT: 13 % (ref 12.3–15.4)
GLOBULIN UR ELPH-MCNC: 3.4 GM/DL
GLUCOSE SERPL-MCNC: 99 MG/DL (ref 65–99)
HBA1C MFR BLD: 5.2 % (ref 4.8–5.6)
HCT VFR BLD AUTO: 41.1 % (ref 34–46.6)
HDLC SERPL-MCNC: 82 MG/DL (ref 40–60)
HGB BLD-MCNC: 13.7 G/DL (ref 12–15.9)
IMM GRANULOCYTES # BLD AUTO: 0.02 10*3/MM3 (ref 0–0.05)
IMM GRANULOCYTES NFR BLD AUTO: 0.3 % (ref 0–0.5)
LDLC SERPL CALC-MCNC: 31 MG/DL (ref 0–100)
LDLC/HDLC SERPL: 0.36 {RATIO}
LYMPHOCYTES # BLD AUTO: 1.65 10*3/MM3 (ref 0.7–3.1)
LYMPHOCYTES NFR BLD AUTO: 25.7 % (ref 19.6–45.3)
MCH RBC QN AUTO: 31.6 PG (ref 26.6–33)
MCHC RBC AUTO-ENTMCNC: 33.3 G/DL (ref 31.5–35.7)
MCV RBC AUTO: 94.9 FL (ref 79–97)
MONOCYTES # BLD AUTO: 0.54 10*3/MM3 (ref 0.1–0.9)
MONOCYTES NFR BLD AUTO: 8.4 % (ref 5–12)
NEUTROPHILS NFR BLD AUTO: 4.07 10*3/MM3 (ref 1.7–7)
NEUTROPHILS NFR BLD AUTO: 63.4 % (ref 42.7–76)
NRBC BLD AUTO-RTO: 0 /100 WBC (ref 0–0.2)
PLATELET # BLD AUTO: 229 10*3/MM3 (ref 140–450)
PMV BLD AUTO: 8.9 FL (ref 6–12)
POTASSIUM SERPL-SCNC: 4.4 MMOL/L (ref 3.5–5.2)
PROT SERPL-MCNC: 7.6 G/DL (ref 6–8.5)
RBC # BLD AUTO: 4.33 10*6/MM3 (ref 3.77–5.28)
SODIUM SERPL-SCNC: 137 MMOL/L (ref 136–145)
TRIGL SERPL-MCNC: 92 MG/DL (ref 0–150)
VLDLC SERPL-MCNC: 17 MG/DL (ref 5–40)
WBC NRBC COR # BLD AUTO: 6.42 10*3/MM3 (ref 3.4–10.8)

## 2024-08-27 PROCEDURE — 80061 LIPID PANEL: CPT | Performed by: FAMILY MEDICINE

## 2024-08-27 PROCEDURE — 99396 PREV VISIT EST AGE 40-64: CPT | Performed by: FAMILY MEDICINE

## 2024-08-27 PROCEDURE — 36415 COLL VENOUS BLD VENIPUNCTURE: CPT | Performed by: FAMILY MEDICINE

## 2024-08-27 PROCEDURE — 80053 COMPREHEN METABOLIC PANEL: CPT | Performed by: FAMILY MEDICINE

## 2024-08-27 PROCEDURE — 83036 HEMOGLOBIN GLYCOSYLATED A1C: CPT | Performed by: FAMILY MEDICINE

## 2024-08-27 PROCEDURE — 85025 COMPLETE CBC W/AUTO DIFF WBC: CPT | Performed by: FAMILY MEDICINE

## 2024-08-27 NOTE — PROGRESS NOTES
"Tracey Green presents for an annual physical exam.     No c/o.  UTD w/GYN.  Rx glasses.    I have reviewed the patient's medical, family, and social history in detail and updated the computerized patient record.    Health Maintenance   Topic Date Due    Annual Gynecologic Pelvic and Breast Exam  Never done    PAP SMEAR  11/06/2022    COVID-19 Vaccine (5 - 2023-24 season) 09/01/2023    ANNUAL PHYSICAL  09/07/2023    INFLUENZA VACCINE  08/01/2024    MAMMOGRAM  12/13/2025    TDAP/TD VACCINES (2 - Td or Tdap) 01/13/2030    COLORECTAL CANCER SCREENING  02/03/2032    HEPATITIS C SCREENING  Completed    Pneumococcal Vaccine 0-64  Aged Out         /60 (BP Location: Left arm, Patient Position: Sitting, Cuff Size: Adult)   Pulse 67   Resp 16   Ht 160 cm (62.99\")   Wt 54.4 kg (120 lb)   SpO2 99%   BMI 21.26 kg/m²      Physical Exam    Vital signs reviewed.  General appearance: No acute distress  Eyes: conjunctiva clear without erythema; pupils equally round and reactive  ENT: external ears and nose normal; hearing normal   Neck: supple; no thyromegaly  CV: normal rate and rhythm; no peripheral edema  Respiratory: normal respiratory effort; lungs clear to auscultation bilaterally  MSK: normal gait and station; no focal joint deformity or swelling  Skin: no rash or wounds; normal turgor  Neuro: cranial nerves 2-12 grossly intact; normal sensation to light touch  Psych: mood and affect normal; recent and remote memory intact    No visits with results within 2 Week(s) from this visit.   Latest known visit with results is:   Office Visit on 09/07/2022   Component Date Value Ref Range Status    Glucose 09/07/2022 95  65 - 99 mg/dL Final    BUN 09/07/2022 10  6 - 20 mg/dL Final    Creatinine 09/07/2022 0.93  0.57 - 1.00 mg/dL Final    Sodium 09/07/2022 140  136 - 145 mmol/L Final    Potassium 09/07/2022 4.2  3.5 - 5.2 mmol/L Final    Chloride 09/07/2022 103  98 - 107 mmol/L Final    CO2 09/07/2022 27.7  22.0 - 29.0 " mmol/L Final    Calcium 09/07/2022 9.5  8.6 - 10.5 mg/dL Final    Total Protein 09/07/2022 7.8  6.0 - 8.5 g/dL Final    Albumin 09/07/2022 4.50  3.50 - 5.20 g/dL Final    ALT (SGPT) 09/07/2022 8  1 - 33 U/L Final    AST (SGOT) 09/07/2022 12  1 - 32 U/L Final    Alkaline Phosphatase 09/07/2022 76  39 - 117 U/L Final    Total Bilirubin 09/07/2022 0.3  0.0 - 1.2 mg/dL Final    Globulin 09/07/2022 3.3  gm/dL Final    A/G Ratio 09/07/2022 1.4  g/dL Final    BUN/Creatinine Ratio 09/07/2022 10.8  7.0 - 25.0 Final    Anion Gap 09/07/2022 9.3  5.0 - 15.0 mmol/L Final    eGFR 09/07/2022 77.4  >60.0 mL/min/1.73 Final    National Kidney Foundation and American Society of Nephrology (ASN) Task Force recommended calculation based on the Chronic Kidney Disease Epidemiology Collaboration (CKD-EPI) equation refit without adjustment for race.    Total Cholesterol 09/07/2022 138  0 - 200 mg/dL Final    Triglycerides 09/07/2022 122  0 - 150 mg/dL Final    HDL Cholesterol 09/07/2022 82 (H)  40 - 60 mg/dL Final    LDL Cholesterol  09/07/2022 35  0 - 100 mg/dL Final    VLDL Cholesterol 09/07/2022 21  5 - 40 mg/dL Final    LDL/HDL Ratio 09/07/2022 0.39   Final    WBC 09/07/2022 6.67  3.40 - 10.80 10*3/mm3 Final    RBC 09/07/2022 4.16  3.77 - 5.28 10*6/mm3 Final    Hemoglobin 09/07/2022 13.5  12.0 - 15.9 g/dL Final    Hematocrit 09/07/2022 40.0  34.0 - 46.6 % Final    MCV 09/07/2022 96.2  79.0 - 97.0 fL Final    MCH 09/07/2022 32.5  26.6 - 33.0 pg Final    MCHC 09/07/2022 33.8  31.5 - 35.7 g/dL Final    RDW 09/07/2022 12.4  12.3 - 15.4 % Final    RDW-SD 09/07/2022 43.4  37.0 - 54.0 fl Final    MPV 09/07/2022 9.2  6.0 - 12.0 fL Final    Platelets 09/07/2022 276  140 - 450 10*3/mm3 Final    Neutrophil % 09/07/2022 65.5  42.7 - 76.0 % Final    Lymphocyte % 09/07/2022 24.6  19.6 - 45.3 % Final    Monocyte % 09/07/2022 7.6  5.0 - 12.0 % Final    Eosinophil % 09/07/2022 1.8  0.3 - 6.2 % Final    Basophil % 09/07/2022 0.4  0.0 - 1.5 % Final     Immature Grans % 09/07/2022 0.1  0.0 - 0.5 % Final    Neutrophils, Absolute 09/07/2022 4.36  1.70 - 7.00 10*3/mm3 Final    Lymphocytes, Absolute 09/07/2022 1.64  0.70 - 3.10 10*3/mm3 Final    Monocytes, Absolute 09/07/2022 0.51  0.10 - 0.90 10*3/mm3 Final    Eosinophils, Absolute 09/07/2022 0.12  0.00 - 0.40 10*3/mm3 Final    Basophils, Absolute 09/07/2022 0.03  0.00 - 0.20 10*3/mm3 Final    Immature Grans, Absolute 09/07/2022 0.01  0.00 - 0.05 10*3/mm3 Final    nRBC 09/07/2022 0.0  0.0 - 0.2 /100 WBC Final        Diagnoses and all orders for this visit:    1. Encounter for annual physical exam (Primary)  -     Comprehensive metabolic panel  -     Hemoglobin A1c  -     Lipid panel  -     CBC w AUTO Differential        Encourage healthy diet and exercise.  Encourage patient to stay up to date on screening examinations as indicated based on age and risk factors.

## 2024-10-22 ENCOUNTER — CLINICAL SUPPORT (OUTPATIENT)
Dept: SPORTS MEDICINE | Facility: CLINIC | Age: 49
End: 2024-10-22
Payer: COMMERCIAL

## 2024-10-22 DIAGNOSIS — Z23 IMMUNIZATION DUE: Primary | ICD-10-CM

## 2024-10-22 PROCEDURE — 90656 IIV3 VACC NO PRSV 0.5 ML IM: CPT | Performed by: FAMILY MEDICINE

## 2024-10-22 PROCEDURE — 90471 IMMUNIZATION ADMIN: CPT | Performed by: FAMILY MEDICINE

## (undated) DEVICE — CANN O2 ETCO2 FITS ALL CONN CO2 SMPL A/ 7IN DISP LF

## (undated) DEVICE — KT ORCA ORCAPOD DISP STRL

## (undated) DEVICE — GOWN ,SIRUS,NONREINFORCED SMALL: Brand: MEDLINE

## (undated) DEVICE — SENSR O2 OXIMAX FNGR A/ 18IN NONSTR

## (undated) DEVICE — TUBING, SUCTION, 1/4" X 10', STRAIGHT: Brand: MEDLINE

## (undated) DEVICE — ADAPT CLN BIOGUARD AIR/H2O DISP